# Patient Record
Sex: MALE | Race: WHITE | NOT HISPANIC OR LATINO | Employment: FULL TIME | URBAN - METROPOLITAN AREA
[De-identification: names, ages, dates, MRNs, and addresses within clinical notes are randomized per-mention and may not be internally consistent; named-entity substitution may affect disease eponyms.]

---

## 2020-11-02 ENCOUNTER — HOSPITAL ENCOUNTER (EMERGENCY)
Facility: CLINIC | Age: 35
Discharge: HOME | End: 2020-11-02
Attending: PHYSICIAN ASSISTANT | Admitting: PHYSICIAN ASSISTANT

## 2020-11-02 VITALS
OXYGEN SATURATION: 97 % | DIASTOLIC BLOOD PRESSURE: 82 MMHG | TEMPERATURE: 97.3 F | HEART RATE: 83 BPM | WEIGHT: 150 LBS | SYSTOLIC BLOOD PRESSURE: 137 MMHG | RESPIRATION RATE: 16 BRPM

## 2020-11-02 DIAGNOSIS — R73.9 HYPERGLYCEMIA: ICD-10-CM

## 2020-11-02 DIAGNOSIS — R74.01 TRANSAMINITIS: ICD-10-CM

## 2020-11-02 DIAGNOSIS — F10.930 ALCOHOL WITHDRAWAL SYNDROME WITHOUT COMPLICATION (H): ICD-10-CM

## 2020-11-02 DIAGNOSIS — E87.20 LACTIC ACIDOSIS: ICD-10-CM

## 2020-11-02 DIAGNOSIS — R25.1 TREMOR: ICD-10-CM

## 2020-11-02 LAB
ALBUMIN SERPL-MCNC: 4.3 G/DL (ref 3.4–5)
ALP SERPL-CCNC: 79 U/L (ref 40–150)
ALT SERPL W P-5'-P-CCNC: 357 U/L (ref 0–70)
ANION GAP SERPL CALCULATED.3IONS-SCNC: 18 MMOL/L (ref 3–14)
AST SERPL W P-5'-P-CCNC: 134 U/L (ref 0–45)
BASOPHILS # BLD AUTO: 0.1 10E9/L (ref 0–0.2)
BASOPHILS NFR BLD AUTO: 1.5 %
BILIRUB SERPL-MCNC: 0.5 MG/DL (ref 0.2–1.3)
BUN SERPL-MCNC: 12 MG/DL (ref 7–30)
CALCIUM SERPL-MCNC: 9.1 MG/DL (ref 8.5–10.1)
CHLORIDE SERPL-SCNC: 103 MMOL/L (ref 94–109)
CO2 SERPL-SCNC: 16 MMOL/L (ref 20–32)
CREAT SERPL-MCNC: 0.8 MG/DL (ref 0.66–1.25)
DIFFERENTIAL METHOD BLD: NORMAL
EOSINOPHIL # BLD AUTO: 0.1 10E9/L (ref 0–0.7)
EOSINOPHIL NFR BLD AUTO: 1 %
ERYTHROCYTE [DISTWIDTH] IN BLOOD BY AUTOMATED COUNT: 13 % (ref 10–15)
ETHANOL SERPL-MCNC: 0.01 G/DL
GFR SERPL CREATININE-BSD FRML MDRD: >90 ML/MIN/{1.73_M2}
GLUCOSE SERPL-MCNC: 226 MG/DL (ref 70–99)
HCT VFR BLD AUTO: 43 % (ref 40–53)
HGB BLD-MCNC: 14.3 G/DL (ref 13.3–17.7)
IMM GRANULOCYTES # BLD: 0 10E9/L (ref 0–0.4)
IMM GRANULOCYTES NFR BLD: 0.3 %
LACTATE BLD-SCNC: 1.3 MMOL/L (ref 0.7–2)
LACTATE BLD-SCNC: 6.8 MMOL/L (ref 0.7–2)
LIPASE SERPL-CCNC: 87 U/L (ref 73–393)
LYMPHOCYTES # BLD AUTO: 1 10E9/L (ref 0.8–5.3)
LYMPHOCYTES NFR BLD AUTO: 16.6 %
MCH RBC QN AUTO: 31.5 PG (ref 26.5–33)
MCHC RBC AUTO-ENTMCNC: 33.3 G/DL (ref 31.5–36.5)
MCV RBC AUTO: 95 FL (ref 78–100)
MONOCYTES # BLD AUTO: 0.4 10E9/L (ref 0–1.3)
MONOCYTES NFR BLD AUTO: 6.1 %
NEUTROPHILS # BLD AUTO: 4.5 10E9/L (ref 1.6–8.3)
NEUTROPHILS NFR BLD AUTO: 74.5 %
NRBC # BLD AUTO: 0 10*3/UL
NRBC BLD AUTO-RTO: 0 /100
PLATELET # BLD AUTO: 262 10E9/L (ref 150–450)
POTASSIUM SERPL-SCNC: 3.5 MMOL/L (ref 3.4–5.3)
PROT SERPL-MCNC: 8.2 G/DL (ref 6.8–8.8)
RBC # BLD AUTO: 4.54 10E12/L (ref 4.4–5.9)
SODIUM SERPL-SCNC: 137 MMOL/L (ref 133–144)
TROPONIN I SERPL-MCNC: <0.015 UG/L (ref 0–0.04)
WBC # BLD AUTO: 6.1 10E9/L (ref 4–11)

## 2020-11-02 PROCEDURE — 99285 EMERGENCY DEPT VISIT HI MDM: CPT | Mod: 25

## 2020-11-02 PROCEDURE — 83605 ASSAY OF LACTIC ACID: CPT | Performed by: PHYSICIAN ASSISTANT

## 2020-11-02 PROCEDURE — 36415 COLL VENOUS BLD VENIPUNCTURE: CPT | Performed by: PHYSICIAN ASSISTANT

## 2020-11-02 PROCEDURE — 250N000011 HC RX IP 250 OP 636: Performed by: PHYSICIAN ASSISTANT

## 2020-11-02 PROCEDURE — 83690 ASSAY OF LIPASE: CPT | Performed by: PHYSICIAN ASSISTANT

## 2020-11-02 PROCEDURE — 96375 TX/PRO/DX INJ NEW DRUG ADDON: CPT

## 2020-11-02 PROCEDURE — 93005 ELECTROCARDIOGRAM TRACING: CPT

## 2020-11-02 PROCEDURE — 85025 COMPLETE CBC W/AUTO DIFF WBC: CPT | Performed by: PHYSICIAN ASSISTANT

## 2020-11-02 PROCEDURE — 96361 HYDRATE IV INFUSION ADD-ON: CPT

## 2020-11-02 PROCEDURE — 80320 DRUG SCREEN QUANTALCOHOLS: CPT | Performed by: PHYSICIAN ASSISTANT

## 2020-11-02 PROCEDURE — 250N000013 HC RX MED GY IP 250 OP 250 PS 637: Performed by: PHYSICIAN ASSISTANT

## 2020-11-02 PROCEDURE — 87040 BLOOD CULTURE FOR BACTERIA: CPT | Performed by: PHYSICIAN ASSISTANT

## 2020-11-02 PROCEDURE — 258N000003 HC RX IP 258 OP 636: Performed by: PHYSICIAN ASSISTANT

## 2020-11-02 PROCEDURE — 84484 ASSAY OF TROPONIN QUANT: CPT | Performed by: PHYSICIAN ASSISTANT

## 2020-11-02 PROCEDURE — 96374 THER/PROPH/DIAG INJ IV PUSH: CPT

## 2020-11-02 PROCEDURE — 80053 COMPREHEN METABOLIC PANEL: CPT | Performed by: PHYSICIAN ASSISTANT

## 2020-11-02 RX ORDER — LANOLIN ALCOHOL/MO/W.PET/CERES
100 CREAM (GRAM) TOPICAL ONCE
Status: COMPLETED | OUTPATIENT
Start: 2020-11-02 | End: 2020-11-02

## 2020-11-02 RX ORDER — DIAZEPAM 5 MG
5 TABLET ORAL EVERY 6 HOURS PRN
Qty: 6 TABLET | Refills: 0 | Status: SHIPPED | OUTPATIENT
Start: 2020-11-02 | End: 2020-11-04

## 2020-11-02 RX ORDER — MAGNESIUM OXIDE 400 MG/1
800 TABLET ORAL ONCE
Status: COMPLETED | OUTPATIENT
Start: 2020-11-02 | End: 2020-11-02

## 2020-11-02 RX ORDER — DIAZEPAM 5 MG
10 TABLET ORAL ONCE
Status: COMPLETED | OUTPATIENT
Start: 2020-11-02 | End: 2020-11-02

## 2020-11-02 RX ORDER — LORAZEPAM 2 MG/ML
1 INJECTION INTRAMUSCULAR ONCE
Status: COMPLETED | OUTPATIENT
Start: 2020-11-02 | End: 2020-11-02

## 2020-11-02 RX ORDER — MULTIVITAMIN,THERAPEUTIC
1 TABLET ORAL ONCE
Status: COMPLETED | OUTPATIENT
Start: 2020-11-02 | End: 2020-11-02

## 2020-11-02 RX ORDER — DEXTROSE, SODIUM CHLORIDE, SODIUM LACTATE, POTASSIUM CHLORIDE, AND CALCIUM CHLORIDE 5; .6; .31; .03; .02 G/100ML; G/100ML; G/100ML; G/100ML; G/100ML
1000 INJECTION, SOLUTION INTRAVENOUS ONCE
Status: COMPLETED | OUTPATIENT
Start: 2020-11-02 | End: 2020-11-02

## 2020-11-02 RX ORDER — FOLIC ACID 1 MG/1
1 TABLET ORAL ONCE
Status: COMPLETED | OUTPATIENT
Start: 2020-11-02 | End: 2020-11-02

## 2020-11-02 RX ORDER — ONDANSETRON 2 MG/ML
4 INJECTION INTRAMUSCULAR; INTRAVENOUS ONCE
Status: COMPLETED | OUTPATIENT
Start: 2020-11-02 | End: 2020-11-02

## 2020-11-02 RX ADMIN — DIAZEPAM 10 MG: 5 TABLET ORAL at 18:06

## 2020-11-02 RX ADMIN — FOLIC ACID 1 MG: 1 TABLET ORAL at 18:06

## 2020-11-02 RX ADMIN — Medication 800 MG: at 18:31

## 2020-11-02 RX ADMIN — THERA TABS 1 TABLET: TAB at 18:06

## 2020-11-02 RX ADMIN — THIAMINE HCL TAB 100 MG 100 MG: 100 TAB at 18:06

## 2020-11-02 RX ADMIN — ONDANSETRON 4 MG: 2 INJECTION INTRAMUSCULAR; INTRAVENOUS at 16:36

## 2020-11-02 RX ADMIN — LORAZEPAM 1 MG: 2 INJECTION INTRAMUSCULAR; INTRAVENOUS at 16:37

## 2020-11-02 RX ADMIN — SODIUM CHLORIDE 1000 ML: 9 INJECTION, SOLUTION INTRAVENOUS at 16:38

## 2020-11-02 RX ADMIN — SODIUM CHLORIDE, SODIUM LACTATE, POTASSIUM CHLORIDE, CALCIUM CHLORIDE AND DEXTROSE MONOHYDRATE 1000 ML: 5; 600; 310; 30; 20 INJECTION, SOLUTION INTRAVENOUS at 18:06

## 2020-11-02 NOTE — ED TRIAGE NOTES
Pulled over when driving. Feeling tired, weak, tremors. Numbness tingling to bilateral hands and feet. Denies unilateral deficits. Denies ETOH today. . Increased stressors at work. Denies hx of anxiety

## 2020-11-02 NOTE — ED NOTES
DATE:  11/2/2020   TIME OF RECEIPT FROM LAB:  5872  LAB TEST:  Lactate   LAB VALUE:  6.8  RESULTS GIVEN WITH READ-BACK TO (PROVIDER):  Data Unavailable  TIME LAB VALUE REPORTED TO PROVIDER:   8661

## 2020-11-02 NOTE — ED AVS SNAPSHOT
[facility] Emergency Dept  [address]  [facility] [address]  Phone: [phone]  Fax: [phone]                                    Salvatore Corona   MRN: [id number]    Department: [facility] Emergency Dept   Date of Visit: 11/2/2020           After Visit Summary Signature Page    I have received my discharge instructions, and my questions have been answered. I have discussed any challenges I see with this plan with the nurse or doctor.    ..........................................................................................................................................  Patient/Patient Representative Signature      ..........................................................................................................................................  Patient Representative Print Name and Relationship to Patient    ..................................................               ................................................  Date                                   Time    ..........................................................................................................................................  Reviewed by Signature/Title    ...................................................              ..............................................  Date                                               Time          22EPIC Rev 08/18

## 2020-11-03 LAB — INTERPRETATION ECG - MUSE: NORMAL

## 2020-11-03 NOTE — ED PROVIDER NOTES
Emergency Department Attending Supervision Note  11/2/2020  10:32 PM      I evaluated this patient in conjunction with Jami Frias PA-C      Briefly, the patient presented with tremors and anxiety.  Last drink about 24 hours ago.  Denies other drug use.  Feeling improved on my exam after ativan but still mildly tremulous      Exam:    Gen: alert  HEENT: PERRL, oropharynx clear, mouth dry  Neck: normal ROM  CV: RRR, no murmurs  Pulm: breath sounds equal, lungs clear  Abd: Soft, nontender  Back: no evidence of injury, no cva tenderness  MSK: no deformity, moves all extremities  Neuro: alert, appropriate conversation and interaction, tongue fasciculations present      MDM and Plan:  Patient presented for tremor and anxiety.  Admits to recent alcohol.  No recent infectious symptoms.  Advised admission for hydration and treatment of withdrawal.  Pt declines admission.  I discussed the risks of withdrawal with the patient including seizures and increasing withdrawal.  Pt is alert and able to  Engage in a detail discussion of risks and benefits.  He is not confused and I do feel he understands his risks benefits and options.  He declines admission.  Encouraged to return if worsening withdrawal symptoms      Diagnosis    ICD-10-CM    1. Alcohol withdrawal syndrome without complication (H)  F10.230 Blood culture     Blood culture     Lactic acid whole blood   2. Tremor  R25.1    3. Lactic acidosis  E87.2    4. Transaminitis  R74.01    5. Hyperglycemia  R73.9             Emma Stovall MD  11/02/20 2237

## 2020-11-03 NOTE — ED PROVIDER NOTES
History     Chief Complaint:  Tremors      HPI   Salvatore Corona is a 35 year old male who presents with an episode of tremors and anxiety this afternoon. Patient states that he was feeling faint after lunch at approximately 2 p.m. While driving home, patient began to feel nauseous and was experiencing tingling in both hands. Patient pulled over and called 9-1-1, as he was having difficult breathing. The numbness/tingling has resolved in hands upon arrival to Emergency Department, but the patient continues to feel weak. He denies abdominal pain, chest pain, shortness of breath. Patient endorses increased anxiety and work and states that he drinks 4-5 drinks nightly most days a week. He has never withdrawal from alcohol before.     Allergies:  NKDA     Medications:    The patient is not currently taking any prescribed medications.    Past Medical History:    History reviewed.  No pertinent past medical history.    Past Surgical History:    Appendectomy  Inguinal hernia repair    Family History:    History reviewed. No pertinent family history.     Social History:  Smoking status: no  Smokeless tobacco: no  Alcohol use: yes  Drug use: no  Marital Status:  Single [1]     Review of Systems  Ten review of systems negative, apart from what is noted above in HPI.     Physical Exam   First Vitals:  Patient Vitals for the past 24 hrs:   BP Temp Temp src Pulse Resp SpO2 Weight   11/02/20 1900 137/82 -- -- 83 -- 97 % --   11/02/20 1800 (!) 152/85 -- -- 87 -- 98 % --   11/02/20 1700 (!) 150/96 -- -- 89 -- 97 % --   11/02/20 1600 (!) 143/85 -- -- 93 -- 97 % --   11/02/20 1531 (!) 155/96 97.3  F (36.3  C) Axillary 98 16 98 % 68 kg (150 lb)       Physical Exam  General: Alert and interactive. Appears anxious.   Eyes: The pupils are equal and round. EOMs intact. No scleral icterus.  ENT: No abnormalities to the external nose or ears. Mucous membranes moist. Posterior oropharynx is non-erythematous.  Neck: Trachea is in the  midline. No nuchal rigidity.    CV: Regular rate and rhythm. S1 and S2 normal without murmur, click, gallop or rub.   Resp: Breath sounds are clear bilaterally, without rhonchi, wheezes, rales. Non-labored, no retractions or accessory muscle use.     GI: Abdomen is soft without distension. No tenderness to palpation. No peritoneal signs.    MS: Moving all extremities well. Good muscle tone.   Skin: Warm and dry. No rash or lesions noted.  Neuro: Tremulous with hands at rest and outstretched. Alert and oriented x 3. No focal neurologic deficits. Good strength and sensation in upper and lower extremities.    Psych: Awake. Alert.  Normal affect. Appropriate interactions.  Lymph: No anterior or posterior cervical lymphadenopathy noted.    Emergency Department Course   ECG (16:34:54):  Rate 82 bpm. KY interval 150. QRS duration 92. QT/QTc 376/439. P-R-T axes 68 -6 50. Normal sinus rhythm. Cannot rule out anterior infarct, age undetermined. Abnormal ECG. V2, V3, V4 T-wave inversion. Interpreted at 1635.    Laboratory:  CBC: AWNL. (WBC 6.1, HGB 14.3, )   CMP: Glucose 225 (H), Anion gap 18 (H),  (H),  (H)  Lipase: 87  Ethanol 0.01 (H)  Lactic acid 6.8 (H)  Troponin <0.015  Blood culture: Pending.   Lactic acid repeat: 1.3    Interventions:  Medications   0.9% sodium chloride BOLUS (0 mLs Intravenous Stopped 11/2/20 1738)   LORazepam (ATIVAN) injection 1 mg (1 mg Intravenous Given 11/2/20 1637)   ondansetron (ZOFRAN) injection 4 mg (4 mg Intravenous Given 11/2/20 1636)   dextrose 5% in lactated ringers infusion (0 mLs Intravenous Stopped 11/2/20 1950)   multivitamin, therapeutic (THERA-VIT) tablet 1 tablet (1 tablet Oral Given 11/2/20 1806)   folic acid (FOLVITE) tablet 1 mg (1 mg Oral Given 11/2/20 1806)   thiamine (B-1) tablet 100 mg (100 mg Oral Given 11/2/20 1806)   magnesium oxide (MAG-OX) tablet 800 mg (800 mg Oral Given 11/2/20 1831)   diazepam (VALIUM) tablet 10 mg (10 mg Oral Given 11/2/20 7646)      Emergency Department Course:  Past medical records, nursing notes, and vitals reviewed.   I performed an exam of the patient as documented above.   The above  labs were obtained. Results above.  I rechecked patient, who was much improved after the above interventions.   Patient staffed with Dr. Stovall.     I discussed the treatment plan with the patient. He expressed understanding of this plan and consented to discharge. Patient will be discharged home with instructions for care and follow up. In addition, the patient will return to the emergency department if symptoms persist, worsen, if new symptoms arise or if there is any concern.  All questions were answered.    Impression & Plan      Medical Decision Making:  Salvatore Corona is a 35 year old male who presents after an anxiety episode at work today and continued tremors. I suspect a large portion of this is due to alcohol withdrawal. Patient drinks daily, increased with stress at work during COVID-19 pandemic. Patient's labs show lactic acidosis with anion gap and transaminitis, consistent with alcohol abuse. EKG shows TWI but no other signs of ischemia, and he denies current chest pain or shortness of breath to suggest ACS. Troponin is negative. Initial lactic acid elevated at 6.8. Patient was given 2L of fluid, Ativan IV and PO valium with near complete resolution of symptoms. Rally pack of vitamins given. No vomiting. He continues to be slightly tremulous but much improved. Repeat lactic acid WNL. He is refusing DETOX or admission for assistance with withdrawal. No focal neurologic deficits to suggest CVA. Patient will be discharged home with Valium to use as needed over the net 48 hours for anxiety or withdrawal symptoms. He understands that he cannot use alcohol while taking Valium. Will return for worsening anxiety, fevers, abdominal pain, persistent vomiting, confusion, other worrisome concerns.     Diagnosis:    ICD-10-CM    1. Alcohol withdrawal  syndrome without complication (H)  F10.230    2. Tremor  R25.1    3. Lactic acidosis  E87.2    4. Transaminitis  R74.01        Disposition:  Discharged to home     Whitney Love  11/2/2020   [facility] EMERGENCY DEPT    Scribe Disclosure:  I, Whitney Love, am serving as a scribe at 7:10 PM on 11/2/2020 to document services personally performed by Jami Wharton PA-C based on my observations and the provider's statements to me.        Jami Wharton PA-C  11/02/20 7968

## 2020-11-08 LAB
BACTERIA SPEC CULT: NO GROWTH
BACTERIA SPEC CULT: NO GROWTH
SPECIMEN SOURCE: NORMAL
SPECIMEN SOURCE: NORMAL

## 2023-04-10 ENCOUNTER — OFFICE VISIT (OUTPATIENT)
Dept: NEUROSURGERY | Facility: CLINIC | Age: 38
End: 2023-04-10
Payer: COMMERCIAL

## 2023-04-10 ENCOUNTER — PRE VISIT (OUTPATIENT)
Dept: NEUROSURGERY | Facility: OTHER | Age: 38
End: 2023-04-10

## 2023-04-10 VITALS — DIASTOLIC BLOOD PRESSURE: 88 MMHG | OXYGEN SATURATION: 98 % | SYSTOLIC BLOOD PRESSURE: 128 MMHG | HEART RATE: 80 BPM

## 2023-04-10 DIAGNOSIS — M54.12 CERVICAL RADICULOPATHY: Primary | ICD-10-CM

## 2023-04-10 PROCEDURE — 99203 OFFICE O/P NEW LOW 30 MIN: CPT | Performed by: NEUROLOGICAL SURGERY

## 2023-04-10 RX ORDER — LORAZEPAM 1 MG/1
TABLET ORAL
COMMUNITY

## 2023-04-10 RX ORDER — LORAZEPAM 1 MG/1
TABLET ORAL
COMMUNITY
Start: 2023-02-03

## 2023-04-10 RX ORDER — CYCLOBENZAPRINE HCL 10 MG
TABLET ORAL
COMMUNITY
Start: 2023-01-10

## 2023-04-10 ASSESSMENT — PAIN SCALES - GENERAL: PAINLEVEL: MODERATE PAIN (5)

## 2023-04-10 NOTE — NURSING NOTE
"Salvatore Corona is a 37 year old male who presents for:  Chief Complaint   Patient presents with     Consult        Initial Vitals:  /88   Pulse 80   SpO2 98%  Estimated body mass index is 20.68 kg/m  as calculated from the following:    Height as of 2/22/06: 5' 8\" (1.727 m).    Weight as of 2/22/06: 136 lb (61.7 kg).. There is no height or weight on file to calculate BSA. BP completed using cuff size: regular  Moderate Pain (5)    Nursing Comments:     Vern Edwards    "
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
## 2023-06-01 ENCOUNTER — HEALTH MAINTENANCE LETTER (OUTPATIENT)
Age: 38
End: 2023-06-01

## 2023-11-07 ENCOUNTER — TELEPHONE (OUTPATIENT)
Dept: NEUROSURGERY | Facility: CLINIC | Age: 38
End: 2023-11-07
Payer: COMMERCIAL

## 2023-11-07 NOTE — TELEPHONE ENCOUNTER
Patient not currently scheduled for surgery with our team. 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 Reviewed they can contact medical records for any questions.

## 2023-11-07 NOTE — TELEPHONE ENCOUNTER
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 Please call Jase back at [phone]. Thank you~

## 2024-05-31 ENCOUNTER — TRANSFERRED RECORDS (OUTPATIENT)
Dept: HEALTH INFORMATION MANAGEMENT | Facility: CLINIC | Age: 39
End: 2024-05-31

## 2024-05-31 LAB
CREATININE (EXTERNAL): 0.8 MG/DL (ref 0.5–1.5)
GLUCOSE (EXTERNAL): 104 MG/DL (ref 60–115)
PHQ9 SCORE: 4
POTASSIUM (EXTERNAL): 3.8 MMOL/L (ref 3.6–5.1)

## 2024-06-03 ENCOUNTER — NURSE TRIAGE (OUTPATIENT)
Dept: NURSING | Facility: CLINIC | Age: 39
End: 2024-06-03
Payer: COMMERCIAL

## 2024-06-03 NOTE — TELEPHONE ENCOUNTER
"Patient had Dr appointment on Friday and was sent to the ED with abdominal pain on Friday. Did CT scan and has diverticulosis.    Prescribed antibiotics and Hydrocodone, which is \"not keeping my pain levels down\" and he is going to be out in two days. Still having pain despite pain medications. Asking for a refill of his pain medication, a work note.    Patient denies need for symptom triage.    He said he reached out on the [employer] portal and was told to go to ED if his pain level was 7/10, but he says he has already been to ED and just needs to see a provider as follow up for pain medications.     He said there were no appointments until Thursday, but that will be too late. He will call back in the morning to try and get a sooner appointment and speak to care team.    Yanira Eckert RN  [facility]         Reason for Disposition   Caller requesting a CONTROLLED substance prescription refill (e.g., narcotics, ADHD medicines)    Protocols used: Medication Refill and Renewal Call-A-AH    "

## 2024-06-27 ENCOUNTER — MEDICAL CORRESPONDENCE (OUTPATIENT)
Dept: HEALTH INFORMATION MANAGEMENT | Facility: CLINIC | Age: 39
End: 2024-06-27
Payer: COMMERCIAL

## 2024-07-24 ENCOUNTER — OFFICE VISIT (OUTPATIENT)
Dept: UROLOGY | Facility: CLINIC | Age: 39
End: 2024-07-24
Payer: COMMERCIAL

## 2024-07-24 VITALS
DIASTOLIC BLOOD PRESSURE: 82 MMHG | HEART RATE: 92 BPM | BODY MASS INDEX: 21.33 KG/M2 | OXYGEN SATURATION: 95 % | HEIGHT: 69 IN | SYSTOLIC BLOOD PRESSURE: 134 MMHG | WEIGHT: 144 LBS

## 2024-07-24 DIAGNOSIS — M62.89 PELVIC FLOOR DYSFUNCTION: Primary | ICD-10-CM

## 2024-07-24 DIAGNOSIS — R39.9 LOWER URINARY TRACT SYMPTOMS (LUTS): ICD-10-CM

## 2024-07-24 LAB
ALBUMIN UR-MCNC: NEGATIVE MG/DL
APPEARANCE UR: CLEAR
BILIRUB UR QL STRIP: NEGATIVE
COLOR UR AUTO: YELLOW
GLUCOSE UR STRIP-MCNC: NEGATIVE MG/DL
HGB UR QL STRIP: NEGATIVE
KETONES UR STRIP-MCNC: NEGATIVE MG/DL
LEUKOCYTE ESTERASE UR QL STRIP: NEGATIVE
NITRATE UR QL: NEGATIVE
PH UR STRIP: 7.5 [PH] (ref 5–7)
RESIDUAL VOLUME (RV) (EXTERNAL): 33
SP GR UR STRIP: 1.01 (ref 1–1.03)
UROBILINOGEN UR STRIP-ACNC: 0.2 E.U./DL

## 2024-07-24 PROCEDURE — 81003 URINALYSIS AUTO W/O SCOPE: CPT | Mod: QW | Performed by: NURSE PRACTITIONER

## 2024-07-24 PROCEDURE — 99203 OFFICE O/P NEW LOW 30 MIN: CPT | Mod: 25 | Performed by: NURSE PRACTITIONER

## 2024-07-24 PROCEDURE — 51798 US URINE CAPACITY MEASURE: CPT | Performed by: NURSE PRACTITIONER

## 2024-07-24 RX ORDER — OXYCODONE HYDROCHLORIDE 5 MG/1
5 TABLET ORAL EVERY 6 HOURS PRN
COMMUNITY
Start: 2024-06-07

## 2024-07-24 RX ORDER — CELECOXIB 100 MG/1
100 CAPSULE ORAL DAILY
COMMUNITY
Start: 2024-07-22

## 2024-07-24 ASSESSMENT — PAIN SCALES - GENERAL: PAINLEVEL: NO PAIN (0)

## 2024-07-24 NOTE — PATIENT INSTRUCTIONS
Instructions for Performing the Credé Maneuver    The Credé maneuver is a technique used to assist with bladder emptying by applying gentle pressure to the lower abdomen. Please follow these steps carefully:    Find a Comfortable Position: Stand or sit on the toilet in a relaxed position with your feet flat on the floor.    2. Empty Your Bladder: Begin by attempting to empty your bladder as you normally would.    3. Apply Pressure: Once you've finished urinating, place your hands on your lower abdomen, just above your pubic bone.    4. Gentle Pressure: Apply gentle, steady pressure with your hands, pushing downward towards your bladder.    5. Breathe: Take slow, deep breaths as you apply pressure. Relax your muscles and allow your bladder to empty fully.    6. Repeat if Necessary: If you feel like there's still urine remaining in your bladder, you can repeat the maneuver one or two more times.    7. Be Gentle: Avoid pressing too hard or causing discomfort. The pressure should be firm but gentle.    Follow Medical Advice: If you're unsure about performing the Credé maneuver or if you have any concerns about your bladder function, please consult with your healthcare provider.    Remember, the Credé maneuver is a helpful technique for assisting with bladder emptying, but it's important to use it safely and appropriately. If you have any questions or difficulties, don't hesitate to reach out to your healthcare provider for guidance.   List of Common Bladder Irritants  Alcoholic beverages  Apples and apple juice  Cantaloupe  Carbonated beverages  Chili and spicy foods  Chocolate  Citrus fruit  Coffee (including decaffeinated)  Cranberries and cranberry juice  Grapes  Guava  Milk Products: milk, cheese, cottage cheese, yogurt, ice cream  Peaches  Pineapple  Plums  Strawberries  Sugar especially artificial sweeteners, saccharin, aspartame, corn sweeteners, honey, fructose, sucrose, lactose  Tea  Tomatoes and tomato  juice  Vitamin B complex  Vinegar    Most people are not sensitive to ALL of these products; your goal is to find the foods that make YOUR symptoms worse    Try eliminating one or more of these foods from your diet and see if your symptoms improve. If your bladder symptoms are related to dietary factors, strict adherence to a diet that  eliminates the food should bring marked relief within 10 days. Once you are feeling better, you can begin to add foods back into your diet, one at a time. If symptoms return, you will be able to identify the irritant.

## 2024-07-24 NOTE — PROGRESS NOTES
Urology Outpatient Visit    Name: Salvatore Corona    MRN: [id number]   YOB: 1985               Chief Complaint:   LUTS         Impression and Plan:   Impression / Plan:   Salvatore Corona is a 39 year old male with ongoing left lower quadrant abdominal pain, and pelvic pain w LUTS, sensation of incomplete emptying. PVR 33 mL      Discussed with patient is do not suspect urologic cause for his ongoing LLQ pain and joint pain. We discussed that he is emptying his bladder fully.  He was provided with information on the Crede maneuver to use when he feels like he is not emptying well.  Discussed concern for pelvic floor dysfunction.  Pelvic floor physical therapy referral placed.    Thank you for the opportunity to participate in the care of Salvatore Corona.     RUDDY Duff, CNP  [facility] - Department of Urology  [phone]          History of Present Illness:   Salvatore Corona is a 39 year old male here today in emergency department follow-up.  He was originally seen in the emergency department on 5- with left lower quadrant abdominal pain and sensation of incomplete emptying/pelvic pressure.  He notes that he feels the pain in his left groin as well.  His PVR today is 33 mL.  In the emergency department a CT scan was obtained which showed possible sigmoid colitis.  He was prescribed ciprofloxacin for this.  He has been seeing GI, as his symptoms have been persisting, he is scheduled undergo colonoscopy August 16.  He reports having to sit down to empty his bladder.  He complains of joint pain and is seeing rheumatology.  He is getting an x-ray done of his hands, and potentially MRI if x-ray is unremarkable.  Reports nocturia 2 times per night.  Reports his urinary stream is normal.  No urinary urgency no urinary frequency no stopping or starting of stream. Patient reports having pelvic pain (in genitals, perineum, pubic or bladder area), nocturia 2+/night, and having feeling of  increased pelvic pressure or sensation of pelvic organs slipping down or falling out, feeling unable to completely empty bladder.    History is obtained from patient & EMR    Pertinent imaging reviewed:  CT abd/p 5/31/24  Normal kidneys, inflammation in the left lower quadrant, potentially indicative of colitis or   diverticulitis.           Past Medical History:     Past Medical History:   Diagnosis Date    Hernia, abdominal     NO ACTIVE PROBLEMS 02/22/2006          Past Surgical History:     Past Surgical History:   Procedure Laterality Date    ZZC APPENDECTOMY            Social History:     Social History     Tobacco Use    Smoking status: Never     Passive exposure: Never    Smokeless tobacco: Never   Substance Use Topics    Alcohol use: No          Family History:     Family History   Problem Relation Age of Onset    C.A.D. No family hx of     Diabetes No family hx of     Cancer - colorectal No family hx of     Prostate Cancer No family hx of           Allergies:     Allergies   Allergen Reactions    Escitalopram      Other Reaction(s): GI tolerance, sweating, hallucinations    Serotonin Reuptake Inhibitors (Ssris) Dizziness, Nausea, Other (See Comments) and Visual Disturbance     Other reaction(s): GI tolerance, sweating, hallucinations    Cat Hair Extract Other (See Comments)    Dust Mites Other (See Comments)          Medications:     Current Outpatient Medications   Medication Sig Dispense Refill    celecoxib (CELEBREX) 100 MG capsule Take 100 mg by mouth daily      cyclobenzaprine (FLEXERIL) 10 MG tablet       oxyCODONE (ROXICODONE) 5 MG tablet Take 5 mg by mouth every 6 hours as needed      LORazepam (ATIVAN) 1 MG tablet       LORazepam (ATIVAN) 1 MG tablet        No current facility-administered medications for this visit.          Review of Systems:    ROS: See HPI for pertinent details.  Remainder of 10-point ROS negative.         Physical Exam:   VS:  T: Data Unavailable    HR: 92    BP: 134/82     "RR: Data Unavailable     GEN:  Alert.  NAD.   HEENT:  Sclerae anicteric.    CV:  No obvious jugular venous distension.  LUNGS: No respiratory distress, breathing comfortably wo accessory muscle use.  ABD:  ND.     : normal  exam  SKIN:  Dry. No visible rashes.   NEURO:  CN grossly intact.          Laboratory Data:   No results found for: \"PSA\"  Lab Results   Component Value Date    CR 0.80 11/02/2020     Lab Results   Component Value Date    GFRESTIMATED >90 11/02/2020        "

## 2024-07-24 NOTE — NURSING NOTE
Chief Complaint   Patient presents with    LUTS     3 months of lower left abdominal pain.    Patient states he has pain with urination and retention and was seen at ER back in MAY 2024.    PVR: 33mL        Khloe Goel MA on 7/24/2024 at 2:24 PM

## 2024-07-24 NOTE — LETTER
7/24/2024       RE: Salvatore Corona  [address] [id number]     Dear Colleague,    Thank you for referring your patient, Salvatore Corona, to the [facility] at [facility]. Please see a copy of my visit note below.      Urology Outpatient Visit    Name: Salvatore Corona    MRN: [id number]   YOB: 1985               Chief Complaint:   LUTS         Impression and Plan:   Impression / Plan:   Salvatore Corona is a 39 year old male with ongoing left lower quadrant abdominal pain, and pelvic pain w LUTS, sensation of incomplete emptying. PVR 33 mL      Discussed with patient is do not suspect urologic cause for his ongoing LLQ pain and joint pain. We discussed that he is emptying his bladder fully.  He was provided with information on the Crede maneuver to use when he feels like he is not emptying well.  Discussed concern for pelvic floor dysfunction.  Pelvic floor physical therapy referral placed.    Thank you for the opportunity to participate in the care of Salvatore Corona.     RUDDY Duff, CNP   [facility] - Department of Urology  [phone]          History of Present Illness:   Salvatore Corona is a 39 year old male here today in emergency department follow-up.  He was originally seen in the emergency department on 5- with left lower quadrant abdominal pain and sensation of incomplete emptying/pelvic pressure.  He notes that he feels the pain in his left groin as well.  His PVR today is 33 mL.  In the emergency department a CT scan was obtained which showed possible sigmoid colitis.  He was prescribed ciprofloxacin for this.  He has been seeing GI, as his symptoms have been persisting, he is scheduled undergo colonoscopy August 16.  He reports having to sit down to empty his bladder.  He complains of joint pain and is seeing rheumatology.  He is getting an x-ray done of his hands, and  potentially MRI if x-ray is unremarkable.  Reports nocturia 2 times per night.  Reports his urinary stream is normal.  No urinary urgency no urinary frequency no stopping or starting of stream. Patient reports having pelvic pain (in genitals, perineum, pubic or bladder area), nocturia 2+/night, and having feeling of increased pelvic pressure or sensation of pelvic organs slipping down or falling out, feeling unable to completely empty bladder.    History is obtained from patient & EMR    Pertinent imaging reviewed:  CT abd/p 5/31/24  Normal kidneys, inflammation in the left lower quadrant, potentially indicative of colitis or   diverticulitis.           Past Medical History:     Past Medical History:   Diagnosis Date    Hernia, abdominal     NO ACTIVE PROBLEMS 02/22/2006          Past Surgical History:     Past Surgical History:   Procedure Laterality Date    ZZC APPENDECTOMY            Social History:     Social History     Tobacco Use    Smoking status: Never     Passive exposure: Never    Smokeless tobacco: Never   Substance Use Topics    Alcohol use: No          Family History:     Family History   Problem Relation Age of Onset    C.A.D. No family hx of     Diabetes No family hx of     Cancer - colorectal No family hx of     Prostate Cancer No family hx of           Allergies:     Allergies   Allergen Reactions    Escitalopram      Other Reaction(s): GI tolerance, sweating, hallucinations    Serotonin Reuptake Inhibitors (Ssris) Dizziness, Nausea, Other (See Comments) and Visual Disturbance     Other reaction(s): GI tolerance, sweating, hallucinations    Cat Hair Extract Other (See Comments)    Dust Mites Other (See Comments)          Medications:     Current Outpatient Medications   Medication Sig Dispense Refill    celecoxib (CELEBREX) 100 MG capsule Take 100 mg by mouth daily      cyclobenzaprine (FLEXERIL) 10 MG tablet       oxyCODONE (ROXICODONE) 5 MG tablet Take 5 mg by mouth every 6 hours as needed       "LORazepam (ATIVAN) 1 MG tablet       LORazepam (ATIVAN) 1 MG tablet        No current facility-administered medications for this visit.          Review of Systems:    ROS: See HPI for pertinent details.  Remainder of 10-point ROS negative.         Physical Exam:   VS:  T: Data Unavailable    HR: 92    BP: 134/82    RR: Data Unavailable     GEN:  Alert.  NAD.   HEENT:  Sclerae anicteric.    CV:  No obvious jugular venous distension.  LUNGS: No respiratory distress, breathing comfortably wo accessory muscle use.  ABD:  ND.     : normal  exam  SKIN:  Dry. No visible rashes.   NEURO:  CN grossly intact.          Laboratory Data:   No results found for: \"PSA\"  Lab Results   Component Value Date    CR 0.80 11/02/2020     Lab Results   Component Value Date    GFRESTIMATED >90 11/02/2020          "

## 2024-08-04 ENCOUNTER — HEALTH MAINTENANCE LETTER (OUTPATIENT)
Age: 39
End: 2024-08-04

## 2024-08-29 ENCOUNTER — THERAPY VISIT (OUTPATIENT)
Dept: PHYSICAL THERAPY | Facility: CLINIC | Age: 39
End: 2024-08-29
Attending: NURSE PRACTITIONER
Payer: COMMERCIAL

## 2024-08-29 DIAGNOSIS — M62.89 PELVIC FLOOR DYSFUNCTION: ICD-10-CM

## 2024-08-29 PROCEDURE — 97110 THERAPEUTIC EXERCISES: CPT | Mod: GP | Performed by: PHYSICAL THERAPIST

## 2024-08-29 PROCEDURE — 97530 THERAPEUTIC ACTIVITIES: CPT | Mod: GP | Performed by: PHYSICAL THERAPIST

## 2024-08-29 PROCEDURE — 97161 PT EVAL LOW COMPLEX 20 MIN: CPT | Mod: GP | Performed by: PHYSICAL THERAPIST

## 2024-08-29 NOTE — PROGRESS NOTES
PHYSICAL THERAPY EVALUATION  Type of Visit: Evaluation        Fall Risk Screen:  Fall screen completed by: PT  Have you fallen 2 or more times in the past year?: No  Have you fallen and had an injury in the past year?: No  Is patient a fall risk?: No    Subjective onset of left lower quadrant abdominal pain/groin pain  of unknown etiology. Pt has been previously evaluated for this problem at the [facility], by a urologist and GI doctor. Pt has also noted increased urinary frequency, urgency and the sense of not completely emptying his bladder. Pt referred for physical therapy 24      Presenting condition or subjective complaint: pain and urinary issues  Date of onset: 24    Relevant medical history: Bladder or bowel problems; Neck injury; Pain at night or rest   Dates & types of surgery: hernia and appendix    Prior diagnostic imaging/testing results: CT scan     Prior therapy history for the same diagnosis, illness or injury:        Prior Level of Function  Transfers: Independent  Ambulation: Independent  ADL: Independent  IADL: Childcare, Driving, Finances, Housekeeping, Work    Living Environment  Social support: With family members   Type of home: [employer]; 2-story   Stairs to enter the home: Yes       Ramp: No   Stairs inside the home: Yes   Is there a railing: Yes     Help at home: None  Equipment owned:       Employment: Yes   Hobbies/Interests: running and cycling    Patient goals for therapy: feel normal    Pain assessment: Pain present  Location: left lower quadrant/ left groin and testicles /Ratin/10     Objective      PELVIC EVALUATION  ADDITIONAL HISTORY:  Sex assigned at birth: Male  Gender identity: Male    Pronouns: He/Him/His      Bladder History:  Feels bladder filling: Yes  Triggers for feeling of inability to wait to go to the bathroom: No    How long can you wait to urinate: 30 min  Gets up at night to urinate: Yes 3  Can stop the flow of urine when  urinating: Yes  Volume of urine usually released: Average   Other issues:    Number of bladder infections in last 12 months:    Fluid intake per day: 1000 500 0  Medications taken for bladder: No     Activities causing urine leak:      Amount of urine typically leaked:    Pads used to help with leaking: No        Bowel History:  Frequency of bowel movement:    Consistency of stool: Hard    Ignores the urge to defecate: No  Other bowel issues:    Length of time spent trying to have a bowel movement:      Sexual Function History:  Sexual orientation: Straight    Sexually active: Yes  Lubrication used: No No  Pelvic pain: Walking; Standing; Sitting; Certain positions    Pain or difficulty with orgasms/erection/ejaculation: No    State of menopause:    Hormone medications: No      Are you currently pregnant: No  Have you been diagnosed with pelvic prolapse or abdominal separation: No  Do you get regular exercise: Yes  I do this type of exercise: walk  Have you tried pelvic floor strengthening exercises for 4 weeks: No  Do you have any history of trauma that is relevant to your care that you d like to share: No      Discussed reason for referral regarding pelvic health needs and external/internal pelvic floor muscle examination with patient/guardian.  Opportunity provided to ask questions and verbal consent for assessment and intervention was given.    PAIN: Pain Level at Rest: 2/10  Pain Level with Use: 7/10  Pain Location: left lower quadrant/ groin  Pain Quality: Dull  Pain Frequency: constant  POSTURE:  [posterior rotation of the left inominate  LUMBAR SCREEN:  lumbar and groin symptoms relieved bringing both knees to chest  HIP SCREEN:  Strength: WNL   Functional Strength Testing:     PELVIC/SI SCREEN:  Posterior rotation left inominate, weak pelvic stabilzers     PAIN PROVOCATION TEST:  pain with deep palpation of bilateral piriformis muscles   PELVIS/SI SPECIAL TESTS:   BREATHING SYMMETRY:     PELVIC  EXAM  External Visual Inspection:      Integumentary:       External Digital Palpation per Perineum:       Scar:   Location/Type:   Mobility:     Internal Digital Palpation:  Per Vagina:      Per Rectum:        Pelvic Organ Prolapse:       ABDOMINAL ASSESSMENT  Diastasis Rectus Abdominis (LACIE):      Abdominal Activation/Strength:  weak lower abdominals     Scar:   Location/Type:   Mobility:     Fascial Tension/Restriction:     BIOFEEDBACK:  Position:   Surface Electrodes:     Abdominals:     Perianals:   Decreased flexibility noted bilateral piriformis, hip abductors and pelvic floor muscles    DERMATOMES:   DTR S:     Assessment & Plan   CLINICAL IMPRESSIONS  Medical Diagnosis: pelvic floor dysfunction    Treatment Diagnosis: left pelvic pain, decreased flexibility/strength   Impression/Assessment: pelvic floor dyfunction    Clinical Decision Making (Complexity):  Clinical Presentation: Stable/Uncomplicated  Clinical Presentation Rationale: based on medical and personal factors listed in PT evaluation  Clinical Decision Making (Complexity): Low complexity    PLAN OF CARE  Treatment Interventions:  Interventions: Therapeutic Exercise    Long Term Goals     PT Goal 1  Goal Identifier: urinary frequency  Goal Description: decrease frequency of urination from every 30 minutes to every 2 hours during the day  Rationale: to maximize safety and independence with self cares  Target Date: 10/31/24      Frequency of Treatment: 1x/week  Duration of Treatment: 6 weeks    Recommended Referrals to Other Professionals:   Education Assessment:   Learner/Method: No Barriers to Learning    Risks and benefits of evaluation/treatment have been explained.   Patient/Family/caregiver agrees with Plan of Care.     Evaluation Time:             Signing Clinician: Redi Keita PT        [facility]                                                                                   OUTPATIENT PHYSICAL  THERAPY      PLAN OF TREATMENT FOR OUTPATIENT REHABILITATION   Patient's Last Name, First Name, Salvatore Downs YOB: 1985   Provider's Name   [facility]   Medical Record No.  [id number]     Onset Date: 07/24/24  Start of Care Date: 08/29/24     Medical Diagnosis:  pelvic floor dysfunction      PT Treatment Diagnosis:  left pelvic pain, decreased flexibility/strength Plan of Treatment  Frequency/Duration: 1x/week/ 6 weeks    Certification date from 08/29/24 to 10/10/24         See note for plan of treatment details and functional goals     Reid Keita, PT                         I CERTIFY THE NEED FOR THESE SERVICES FURNISHED UNDER        THIS PLAN OF TREATMENT AND WHILE UNDER MY CARE     (Physician attestation of this document indicates review and certification of the therapy plan).              Referring Provider:  Marques Dunn    Initial Assessment  See Epic Evaluation- Start of Care Date: 08/29/24

## 2024-09-09 ENCOUNTER — THERAPY VISIT (OUTPATIENT)
Dept: PHYSICAL THERAPY | Facility: CLINIC | Age: 39
End: 2024-09-09
Attending: NURSE PRACTITIONER
Payer: COMMERCIAL

## 2024-09-09 DIAGNOSIS — M62.89 PELVIC FLOOR DYSFUNCTION: Primary | ICD-10-CM

## 2024-09-09 PROCEDURE — 97110 THERAPEUTIC EXERCISES: CPT | Mod: GP | Performed by: PHYSICAL THERAPIST

## 2024-09-16 ENCOUNTER — THERAPY VISIT (OUTPATIENT)
Dept: PHYSICAL THERAPY | Facility: CLINIC | Age: 39
End: 2024-09-16
Payer: COMMERCIAL

## 2024-09-16 DIAGNOSIS — M99.05 SOMATIC DYSFUNCTION OF PELVIC REGION: Primary | ICD-10-CM

## 2024-09-16 PROCEDURE — 97110 THERAPEUTIC EXERCISES: CPT | Mod: GP | Performed by: PHYSICAL THERAPIST

## 2024-09-16 PROCEDURE — 97014 ELECTRIC STIMULATION THERAPY: CPT | Mod: GP | Performed by: PHYSICAL THERAPIST

## 2024-09-16 PROCEDURE — 97010 HOT OR COLD PACKS THERAPY: CPT | Mod: GP | Performed by: PHYSICAL THERAPIST

## 2024-11-05 ENCOUNTER — TRANSCRIBE ORDERS (OUTPATIENT)
Dept: OTHER | Age: 39
End: 2024-11-05

## 2024-11-05 DIAGNOSIS — G89.29 CHRONIC MIDLINE LOW BACK PAIN WITH RIGHT-SIDED SCIATICA: ICD-10-CM

## 2024-11-05 DIAGNOSIS — M54.41 CHRONIC MIDLINE LOW BACK PAIN WITH RIGHT-SIDED SCIATICA: ICD-10-CM

## 2024-11-05 DIAGNOSIS — R10.32 LEFT GROIN PAIN: Primary | ICD-10-CM

## 2024-11-06 ENCOUNTER — THERAPY VISIT (OUTPATIENT)
Dept: PHYSICAL THERAPY | Facility: CLINIC | Age: 39
End: 2024-11-06
Payer: COMMERCIAL

## 2024-11-06 DIAGNOSIS — G89.29 CHRONIC LEFT-SIDED LOW BACK PAIN WITHOUT SCIATICA: ICD-10-CM

## 2024-11-06 DIAGNOSIS — M54.50 CHRONIC LEFT-SIDED LOW BACK PAIN WITHOUT SCIATICA: ICD-10-CM

## 2024-11-06 DIAGNOSIS — M25.552 HIP PAIN, LEFT: Primary | ICD-10-CM

## 2024-11-06 PROCEDURE — 97110 THERAPEUTIC EXERCISES: CPT | Mod: GP | Performed by: PHYSICAL THERAPIST

## 2024-11-06 PROCEDURE — 97162 PT EVAL MOD COMPLEX 30 MIN: CPT | Mod: GP | Performed by: PHYSICAL THERAPIST
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
## 2024-11-25 PROBLEM — M99.05 SOMATIC DYSFUNCTION OF PELVIC REGION: Status: RESOLVED | Noted: 2024-09-16 | Resolved: 2024-11-25

## 2024-11-25 PROBLEM — M25.552 HIP PAIN, LEFT: Status: RESOLVED | Noted: 2024-11-06 | Resolved: 2024-11-25

## 2024-11-25 PROBLEM — M54.50 LUMBAGO: Status: RESOLVED | Noted: 2024-11-06 | Resolved: 2024-11-25

## 2025-08-16 ENCOUNTER — HEALTH MAINTENANCE LETTER (OUTPATIENT)
Age: 40
End: 2025-08-16